# Patient Record
Sex: MALE | Race: WHITE
[De-identification: names, ages, dates, MRNs, and addresses within clinical notes are randomized per-mention and may not be internally consistent; named-entity substitution may affect disease eponyms.]

---

## 2017-01-14 ENCOUNTER — HOSPITAL ENCOUNTER (EMERGENCY)
Dept: HOSPITAL 10 - FTE | Age: 37
Discharge: HOME | End: 2017-01-14
Payer: COMMERCIAL

## 2017-01-14 VITALS
HEIGHT: 64 IN | BODY MASS INDEX: 32.74 KG/M2 | WEIGHT: 191.8 LBS | WEIGHT: 191.8 LBS | HEIGHT: 64 IN | BODY MASS INDEX: 32.74 KG/M2

## 2017-01-14 DIAGNOSIS — E11.9: ICD-10-CM

## 2017-01-14 DIAGNOSIS — R00.2: ICD-10-CM

## 2017-01-14 DIAGNOSIS — I10: ICD-10-CM

## 2017-01-14 DIAGNOSIS — R07.89: Primary | ICD-10-CM

## 2017-01-14 PROCEDURE — 71010: CPT

## 2017-01-14 PROCEDURE — 93005 ELECTROCARDIOGRAM TRACING: CPT

## 2017-01-14 NOTE — RADRPT
PROCEDURE:   XR Chest. 

 

CLINICAL INDICATION:   Shortness of breath.

 

TECHNIQUE:   Single frontal view. 

 

COMPARISON:   Chest pain and palpitations. 

 

FINDINGS:

The lungs are clear. 

The heart size is normal. 

There is no pleural effusion. 

There is no pneumothorax.   

 

IMPRESSION:

1.  Normal chest radiograph.   

 

RPTAT: QQ

_____________________________________________ 

.Ray Hamlin MD, MD           Date    Time 

Electronically viewed and signed by .Ray Hamlin MD, MD on 01/14/2017 11:39 

 

D:  01/14/2017 11:39  T:  01/14/2017 11:39

.R/

## 2017-01-14 NOTE — ERD
ER Documentation


Chief Complaint


Date/Time


DATE: 17 


TIME: 11:07


Chief Complaint


nausea, back,arm tingling, chest disconfort intermittent since last 





HPI


Patient is a 36-year-old male with PMHx of DM, HTN  who presents to the 

emergency department with multiple concerns including back pain, arm tingling, 

chest discomfort and palpitations. Patient states that his symptoms started 

approximately 4 days ago. Patient states that he woke up feeling palpitations, 

7 hours ago. Patient states that palpitations lasted a few seconds and occurred 

approximately 6 times throughout the day. Patient states since then he has had 

some left-sided chest discomfort but denies any pain. The discomfort is 

intermittent in nature, denies pain with exertion. He states denies that it is 

throbbing or sharp in nature. Patient states that is localized in the left 

chest and does not radiate. Patient denies any shortness of breath.  Patient 

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 

currently in the hospital. Patient reports intermittent nausea but denies any 

vomiting. Patient denies any fever, chills, abdominal pain, unilateral weakness

, slurred speech. Patient denies any history of recent surgery, unilateral leg 

swelling, recent air travel or prolonged sitting. 





Of note, patient reported not taking his BP or HLD medications starting 

beginning of this year given that he was trying "to eat healthy and exercise 

more."





ROS


All systems reviewed and are negative except as per history of present illness.





PMhx/Soc


History of Surgery:  No


Anesthesia Reaction:  No


Hx Neurological Disorder:  No


Hx Cardiac Disorders:  No


Hx Miscellaneous Medical Probl:  Yes (DM, HLD)


Hx Alcohol Use:  No


Hx Substance Use:  No


Hx Tobacco Use:  No





FmHx


Family History:  diabetes, 


   No coronary disease





Physical Exam


Vitals





Vital Signs








  Date Time  Temp Pulse Resp B/P Pulse Ox O2 Delivery O2 Flow Rate FiO2


 


17 10:24 98.8 80 22 168/93 99   








Physical Exam


GENERAL: Well-developed, well-nourished male. Appears in no acute distress. 

Appears to be in no acute respiratory distress. Speaking in full sentences.


HEAD: Normocephalic, atraumatic. No deformities or ecchymosis. 


EYE: Pupils equal, round, and reactive to light. EOMs intact. No conjunctival 

erythema. No scleral icterus. No eye discharge. 


ENT: External ear without any masses or tenderness. Auditory canals clear 

bilaterally. TM visualized bilaterally, non-erythematous, non-bulging. Nasal 

mucosa pink with no discharge. Oropharynx is pink without any tonsillar 

erythema or exudates. No uvula deviation. No kissing tonsils. 


NECK: Supple. No lymphadenopathy or thyromegaly. No meningismus. No JVD. No 

bruits. Trachea midline.


LUNG: Clear to auscultation bilaterally. No rhonchi, wheezing, rales or coarse 

breath sounds. 


HEART: Regular rate and rhythm. No murmurs, rubs or gallops.


ABDOMEN: Soft, nontender, and nondistended. Positive bowel sounds in all four 

quadrants. No rebound tenderness, no guarding. (-) McBurney's point tenderness.

  No CVA tenderness.


BACK: No midline tenderness. 


EXTREMITIES: Equal pulses bilaterally. No peripheral clubbing, cyanosis or 

edema. No unilateral leg swelling.  


NEUROLOGIC: Alert and oriented x3, cooperative. Mood and affect appropriate to 

situation. Cranial nerves II through XII are grossly intact. Normal speech. 

Motor exam: 5/5 strength in upper and lower extremities. Sensory exam: 

Sensation intact to light touch on all four extremities. Cerebellar function 

exam: Rapid alternating movements intact. No dysmetria on finger-to-nose. 

Steady gait. No pronator drift. (-) Brudzinski sign. (-) Kernig's sign.


SKIN: Normal color. Warm and dry. No rashes or lesions.





Procedures/MDM


ED COURSE:


The patient was stable throughout ED course. I kept the patient and/or family 

informed of laboratory and diagnostic imaging results throughout the ED course.

  





EKG:


Read by Dr. Prince, attending physician.


EKG shows normal sinus rhythm at a rate of 71 bpm.


No arrhythmias, acute ST elevations or T wave changes were noted.


 


DIAGNOSTIC IMAGING:


Read by radiologist.


 DIAGNOSTIC IMAGING REPORT





 Patient: VIDHI SANTANA   : 1980   Age: 36  Sex: M                    

    


 MR #:    [id number]   Acct #:   [id number]    DOS: 17 1105


 Ordering MD: ALEXIS MCLEAN PA-C   Location:  [facility]   Room/Bed:                 

                           


 








PROCEDURE:   XR Chest. 


 


CLINICAL INDICATION:   Shortness of breath.


 


TECHNIQUE:   Single frontal view. 


 


COMPARISON:   Chest pain and palpitations. 


 


FINDINGS:


The lungs are clear. 


The heart size is normal. 


There is no pleural effusion. 


There is no pneumothorax.   


 


IMPRESSION:


1.  Normal chest radiograph.   


 


RPTAT: QQ


_____________________________________________ 


.Ray Hamlin MD, MD           Date    Time 


Electronically viewed and signed by .Ray Hamlin MD, MD on 2017 11:39 


 


D:  2017 11:39  T:  2017 11:39


.R/





CC: ALEXIS MCLEAN PA-C














MEDICAL DECISION MAKING:


This is a 36-year-old male who presents with multiple concerns including nausea

, back pain, chest discomfort and intermittent palpitations x 4 days. Patient 

states that his palpations have now resolved. Patient reported increased stress 

recently. Vital signs were reviewed. Patient was afebrile. Patient was not 

hypoxic. Cardiac exam was normal. Lung exam was normal. EKG was within normal 

limits. Low suspicion for acute coronary syndrome, arrhythmia or pericarditis. 

CXR was within normal limits. Low suspicion for pneumothorax, pneumonia or 

pleural effusion. Full neuro exam was normal. Low suspicion for CVA or TIA.


Given these findings, the patients presentation is most consistent with 

palpitations and chest pain of unknown etiology. 








DISCHARGE:


At this time, patient is stable for discharge and outpatient management. I have 

instructed the patient to follow-up with his/her primary care physician in 1-2 

days. If symptoms persist, patient may need to see a cardiology for further 

examinations and testing including stress test or 24 hour holter monitoring . I 

have instructed the patient to promptly return to the ER at any time for any 

new or worsening symptoms including increased increased pain, fever, nausea, 

vomiting, numbness, weakness, diaphoresis or LOC. The patient and/or family 

expressed understanding of and agreement with this plan. All questions were 

answered. Home care instructions were provided. 








Patients blood pressure was elevated (>120/80) but appears stable without 

evidence of hypertensive emergency, hypertensive urgency or end-organ failure. 

I had discussion with the patient about the risks of hypertension. I have 

advised the patient to follow up with his/her primary care physician for 

outpatient monitoring and treatment for hypertension in 2-3 days. I urged the 

patient to take his medications today and discuss any discontinuations of 

medications with his PCP prior to do so.  I have instructed the patient to 

return to the ER for any new or worsening symptoms including chest pain, 

shortness of breath, headache, blurred vision, confusion, nausea, vomiting or 

LOC.





Departure


Diagnosis:  


 Primary Impression:  


 Chest discomfort


 Additional Impression:  


 Palpitations


Condition:  Stable


Patient Instructions:  Palpitations


Referrals:  


THOMAS GARZA DAVID G ALVES, CARLOS M. MD ARORA, RAMESH K MD





Additional Instructions:  


Call your primary care doctor TOMORROW for an appointment during the next 1-2 

days.See the doctor sooner or return here if your condition worsens before your 

appointment time.





Patient may need to see a cardiologist for further workup of his symptoms. 

Patient may need stress test or 24 hour holter monitoring if symptoms persist.











ALEXIS MCLEAN PA-C 2017 11:16

## 2018-07-30 ENCOUNTER — HOSPITAL ENCOUNTER (EMERGENCY)
Dept: HOSPITAL 91 - E/R | Age: 38
Discharge: HOME | End: 2018-07-30
Payer: COMMERCIAL

## 2018-07-30 ENCOUNTER — HOSPITAL ENCOUNTER (EMERGENCY)
Age: 38
Discharge: HOME | End: 2018-07-30

## 2018-07-30 DIAGNOSIS — F17.210: ICD-10-CM

## 2018-07-30 DIAGNOSIS — E11.65: Primary | ICD-10-CM

## 2018-07-30 DIAGNOSIS — I10: ICD-10-CM

## 2018-07-30 DIAGNOSIS — Z79.4: ICD-10-CM

## 2018-07-30 LAB
ADD MAN DIFF?: NO
ANION GAP: 14 (ref 8–16)
BASOPHIL #: 0 10^3/UL (ref 0–0.1)
BASOPHILS %: 0.6 % (ref 0–2)
BLOOD UREA NITROGEN: 15 MG/DL (ref 7–20)
CALCIUM: 9.2 MG/DL (ref 8.4–10.2)
CARBON DIOXIDE: 24 MMOL/L (ref 21–31)
CHLORIDE: 102 MMOL/L (ref 97–110)
CREATININE: 0.91 MG/DL (ref 0.61–1.24)
EOSINOPHILS #: 0.1 10^3/UL (ref 0–0.5)
EOSINOPHILS %: 1.8 % (ref 0–7)
FIO2: 21 %
GLUCOSE: 364 MG/DL (ref 70–220)
HEMATOCRIT: 45.3 % (ref 42–52)
HEMOGLOBIN: 15.8 G/DL (ref 14–18)
LYMPHOCYTES #: 4 10^3/UL (ref 0.8–2.9)
LYMPHOCYTES %: 60 % (ref 15–51)
MEAN CORPUSCULAR HEMOGLOBIN: 30.3 PG (ref 29–33)
MEAN CORPUSCULAR HGB CONC: 34.9 G/DL (ref 32–37)
MEAN CORPUSCULAR VOLUME: 86.9 FL (ref 82–101)
MEAN PLATELET VOLUME: 10 FL (ref 7.4–10.4)
METHGB VENOUS: 0.1 %
MODE: (no result)
MONOCYTE #: 0.5 10^3/UL (ref 0.3–0.9)
MONOCYTES %: 8.1 % (ref 0–11)
NEUTROPHIL #: 1.9 10^3/UL (ref 1.6–7.5)
NEUTROPHILS %: 29.3 % (ref 39–77)
NUCLEATED RED BLOOD CELLS #: 0 10^3/UL (ref 0–0)
NUCLEATED RED BLOOD CELLS%: 0 /100WBC (ref 0–0)
PLATELET COUNT: 250 10^3/UL (ref 140–415)
POTASSIUM: 3.7 MMOL/L (ref 3.5–5.1)
RED BLOOD COUNT: 5.21 10^6/UL (ref 4.7–6.1)
RED CELL DISTRIBUTION WIDTH: 11.9 % (ref 11.5–14.5)
SAMPLE TYPE: (no result)
SODIUM: 136 MMOL/L (ref 135–144)
VENOUS COHB: 0.8 %
VENOUS FRACTION OXYHGB: 55.6 %
VENOUS OXYGEN SAT: 56.1 MMHG (ref 55–75)
VENOUS TOTAL HEMGLOBIN: 16.1 G/DL
WHITE BLOOD COUNT: 6.6 10^3/UL (ref 4.8–10.8)

## 2018-07-30 PROCEDURE — 80048 BASIC METABOLIC PNL TOTAL CA: CPT

## 2018-07-30 PROCEDURE — 36415 COLL VENOUS BLD VENIPUNCTURE: CPT

## 2018-07-30 PROCEDURE — 85025 COMPLETE CBC W/AUTO DIFF WBC: CPT

## 2018-07-30 PROCEDURE — 82803 BLOOD GASES ANY COMBINATION: CPT

## 2018-07-30 PROCEDURE — 93005 ELECTROCARDIOGRAM TRACING: CPT

## 2018-07-30 PROCEDURE — 99284 EMERGENCY DEPT VISIT MOD MDM: CPT

## 2018-07-30 RX ADMIN — THIAMINE HYDROCHLORIDE 1 MLS/HR: 100 INJECTION, SOLUTION INTRAMUSCULAR; INTRAVENOUS at 06:30
